# Patient Record
Sex: MALE
[De-identification: names, ages, dates, MRNs, and addresses within clinical notes are randomized per-mention and may not be internally consistent; named-entity substitution may affect disease eponyms.]

---

## 2022-11-07 PROBLEM — Z00.00 ENCOUNTER FOR PREVENTIVE HEALTH EXAMINATION: Status: ACTIVE | Noted: 2022-11-07

## 2022-11-08 ENCOUNTER — APPOINTMENT (OUTPATIENT)
Dept: ORTHOPEDIC SURGERY | Facility: CLINIC | Age: 32
End: 2022-11-08

## 2022-11-08 PROCEDURE — 73110 X-RAY EXAM OF WRIST: CPT | Mod: RT

## 2022-11-08 PROCEDURE — 99203 OFFICE O/P NEW LOW 30 MIN: CPT

## 2022-11-08 NOTE — HISTORY OF PRESENT ILLNESS
[8] : 8 [7] : 7 [Stabbing] : stabbing [Throbbing] : throbbing [Tightness] : tightness [Constant] : constant [Household chores] : household chores [Leisure] : leisure [Work] : work [Sleep] : sleep [de-identified] : 11/8/22: 31yo RHD M () for RIGHT wrist pain x 6 months. Denies injury. Had a bump over the dorsal wrist as well, but that resolved after her slapped a bug on a counter on 11/6/22. However, pain became worse and radiated up to his shoulder after that incident. Unable to put pressure on the hand.\par Not currently taking any medication for this.\par \par Hx: none. [FreeTextEntry5] : NP 32 year old m presenting with right wrist pain states he had a lump on his right wrist for a few months last week he slapped a bug on the counter and the pain in his wrist got worse and states he had a lot of swelling and then notice he no longer had the lump on his wrist. States he is still experiencing pain and now is having gripping issues, unable to move his wrist due to a sharp pain, burning sensation. states he is unable to apply pressure.

## 2022-11-08 NOTE — IMAGING
[Right] : right wrist [de-identified] : RIGHT HAND\par skin intact. no swelling.\par no TTP.\par good wrist extension, flexion. good pronation, supination. \par good EPL, FPL. good finger extension, flex to full fist. good finger abduction and adduction. \par SILT to median, ulnar, radial distribution. \par palpable radial pulse, brisk cap refill all digits.\par no triggering.\par \par no pain with wrist flexion, extension. no pain with pronation, supination.\par Rice's test => no pain. no instability.\par DRUJ shear => no pain. no instability. [FreeTextEntry8] : no acute displaced fracture or dislocation.

## 2023-02-14 ENCOUNTER — APPOINTMENT (OUTPATIENT)
Dept: ORTHOPEDIC SURGERY | Facility: CLINIC | Age: 33
End: 2023-02-14
Payer: COMMERCIAL

## 2023-02-14 DIAGNOSIS — G56.21 LESION OF ULNAR NERVE, RIGHT UPPER LIMB: ICD-10-CM

## 2023-02-14 DIAGNOSIS — M67.431 GANGLION, RIGHT WRIST: ICD-10-CM

## 2023-02-14 PROCEDURE — 73110 X-RAY EXAM OF WRIST: CPT | Mod: RT

## 2023-02-14 PROCEDURE — 99213 OFFICE O/P EST LOW 20 MIN: CPT

## 2023-02-14 NOTE — HISTORY OF PRESENT ILLNESS
[Sharp] : sharp [Throbbing] : throbbing [] : yes [Intermittent] : intermittent [Heat] : heat [Bending forward] : bending forward [Extending back] : extending back [de-identified] : 2/14/23: f/u RIGHT dorsal wrist ganglion cyst. Pain had been unchanged and bump had returned, but then he tripped going up the stairs and fell onto his hand on 2/9/23, with temporary increase in pain that radiated up to his shoulder and decrease in size of the bump. Pain has subsided to baseline, but continues to feel stiff. Continues to have pain and difficulty applying pressure through palm. Also c/o one episode of numbness of ulnar hand radiating into small finger while driving about 1 week ago. Also noted numbness over dorsal hand today.\par \par 11/8/22: 31yo RHD M () for RIGHT wrist pain x 6 months. Denies injury. Had a bump over the dorsal wrist as well, but that resolved after her slapped a bug on a counter on 11/6/22. However, pain became worse and radiated up to his shoulder after that incident. Unable to put pressure on the hand.\par Not currently taking any medication for this.\par \par Hx: none. [FreeTextEntry5] :  32 year old M is here for Right Wrist, Pt states no changes since last visit, having radiating recently on the Right Pinky which went numb and had like pins and needles. Pt states having icy hot and tiger balm cream for the pain and using compress wrist brace no relief. Pt states having weakness and tightness on the Right Wrist  .

## 2023-02-14 NOTE — ASSESSMENT
[FreeTextEntry1] : The condition was explained to the patient.\par \par - MRI R wrist to evaluate for ganglion cyst.\par - provided handout on activity/posture modification and night splint for CuTS.\par \par F/u after MRI.
Patient's first and last name, , procedure, and correct site confirmed prior to the start of procedure.

## 2023-02-14 NOTE — IMAGING
[Right] : right wrist [FreeTextEntry8] : no acute displaced fracture or dislocation.  [de-identified] : RIGHT HAND\par skin intact. no swelling.\par no TTP.\par good wrist extension, flexion. good pronation, supination. \par good EPL, FPL. good finger extension, flex to full fist. good finger abduction and adduction. \par numbness of dorsal central hand. otherwise SILT to dorsal ulnar hand, dorsal 1st webspace, all digits.\par palpable radial pulse, brisk cap refill all digits.\par no triggering.\par negative Tinel's at wrist.\par no ulnar nerve subluxation at elbow. + Tinel's at cubital tunnel.\par \par no pain with wrist flexion, extension. no pain with radial or ulnar deviation. no pain with pronation, supination.\par Rice's test => no pain. no instability.\par \par \par XRAYS OF RIGHT WRIST: no acute displaced fracture or dislocation. small calcification adjacent to radiocarpal joint.

## 2023-02-16 ENCOUNTER — FORM ENCOUNTER (OUTPATIENT)
Age: 33
End: 2023-02-16

## 2023-02-17 ENCOUNTER — APPOINTMENT (OUTPATIENT)
Dept: MRI IMAGING | Facility: CLINIC | Age: 33
End: 2023-02-17
Payer: COMMERCIAL

## 2023-02-17 PROCEDURE — 73221 MRI JOINT UPR EXTREM W/O DYE: CPT | Mod: RT

## 2023-02-28 ENCOUNTER — APPOINTMENT (OUTPATIENT)
Dept: ORTHOPEDIC SURGERY | Facility: CLINIC | Age: 33
End: 2023-02-28
Payer: COMMERCIAL

## 2023-02-28 DIAGNOSIS — M65.831 OTHER SYNOVITIS AND TENOSYNOVITIS, RIGHT FOREARM: ICD-10-CM

## 2023-02-28 PROCEDURE — 99213 OFFICE O/P EST LOW 20 MIN: CPT

## 2023-02-28 NOTE — ASSESSMENT
[FreeTextEntry1] : MRI R wrist reviewed with patient. The condition was explained to the patient.\par - recommend wrist brace, activity modification, NSAID PRN. reviewed contra-indicated medical conditions (eg liver disease, kidney disease, or GI ulcer/bleeding) or medications (eg blood thinners). Discussed possible GI and blood pressure side effects.\par  \par F/u PRN.

## 2023-02-28 NOTE — HISTORY OF PRESENT ILLNESS
[0] : 0 [de-identified] : 2/28/23: f/u MRI R wrist. reports a "slight stinging" sensation to the dorsal wrist. feels like he "shocks" the wrist when he throws his backpack over his shoulder.\par \par MRI R wrist 2/17/23 - IMPRESSION:\par 1. 13 mm x 7 mm x 4 mm soft tissue ganglion volar and ulnar to the scaphoid trapezoid joint and volar to the second metacarpal trapezoid joint with no osseous involvement.\par 2. Volar and dorsal extrinsic ligaments sprain with 10-mm ganglion at the origin of the volar extrinsic ligaments.\par 3. No scapholunate ligament tear.\par 4. Tenosynovitis of the extensor tendons to the second compartment.\par 5. 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 Pain had been unchanged and bump had returned, but then he tripped going up the stairs and fell onto his hand on 2/9/23, with temporary increase in pain that radiated up to his shoulder and decrease in size of the bump. Pain has subsided to baseline, but continues to feel stiff. Continues to have pain and difficulty applying pressure through palm. Also c/o one episode of numbness of ulnar hand radiating into small finger while driving about 1 week ago. Also noted numbness over dorsal hand today.\par \par 11/8/22: 31yo RHD M () for RIGHT wrist pain x 6 months. Denies injury. Had a bump over the dorsal wrist as well, but that resolved after her slapped a bug on a counter on 11/6/22. However, pain became worse and radiated up to his shoulder after that incident. Unable to put pressure on the hand.\par Not currently taking any medication for this.\par \par Hx: none. [FreeTextEntry5] :  32 year old M is here for Right Wrist no changes since last Visit, pt states still having some pain localized on the Wrist

## 2023-02-28 NOTE — IMAGING
[de-identified] : RIGHT HAND\par skin intact. no swelling.\par no TTP.\par good wrist extension, flexion. good pronation, supination. \par good EPL, FPL. good finger extension, flex to full fist. good finger abduction and adduction. \par SILT to median, ulnar, radial distributions.\par palpable radial pulse, brisk cap refill all digits.\par no triggering.\par negative Tinel's at wrist.\par \par no pain with wrist flexion, extension. no pain with radial or ulnar deviation. no pain with pronation, supination.\par no pain with resisted wrist or finger extension.\par Rice's maneuver => no pain. no instability.